# Patient Record
Sex: FEMALE | URBAN - METROPOLITAN AREA
[De-identification: names, ages, dates, MRNs, and addresses within clinical notes are randomized per-mention and may not be internally consistent; named-entity substitution may affect disease eponyms.]

---

## 2024-08-13 ENCOUNTER — APPOINTMENT (OUTPATIENT)
Dept: OBSTETRICS AND GYNECOLOGY | Facility: CLINIC | Age: 29
End: 2024-08-13

## 2024-10-14 ENCOUNTER — OFFICE VISIT (OUTPATIENT)
Dept: OBSTETRICS AND GYNECOLOGY | Facility: CLINIC | Age: 29
End: 2024-10-14

## 2024-10-14 VITALS
HEART RATE: 92 BPM | DIASTOLIC BLOOD PRESSURE: 92 MMHG | BODY MASS INDEX: 28.34 KG/M2 | WEIGHT: 154 LBS | HEIGHT: 62 IN | SYSTOLIC BLOOD PRESSURE: 138 MMHG

## 2024-10-14 DIAGNOSIS — Z12.4 CERVICAL CANCER SCREENING: ICD-10-CM

## 2024-10-14 DIAGNOSIS — Z01.419 WOMEN'S ANNUAL ROUTINE GYNECOLOGICAL EXAMINATION: Primary | ICD-10-CM

## 2024-10-14 DIAGNOSIS — Z23 FLU VACCINE NEED: ICD-10-CM

## 2024-10-14 PROCEDURE — 1036F TOBACCO NON-USER: CPT | Performed by: OBSTETRICS & GYNECOLOGY

## 2024-10-14 PROCEDURE — 99385 PREV VISIT NEW AGE 18-39: CPT | Performed by: OBSTETRICS & GYNECOLOGY

## 2024-10-14 PROCEDURE — 3008F BODY MASS INDEX DOCD: CPT | Performed by: OBSTETRICS & GYNECOLOGY

## 2024-10-14 PROCEDURE — 90656 IIV3 VACC NO PRSV 0.5 ML IM: CPT | Performed by: OBSTETRICS & GYNECOLOGY

## 2024-10-14 ASSESSMENT — ENCOUNTER SYMPTOMS
MUSCULOSKELETAL NEGATIVE: 0
HEMATOLOGIC/LYMPHATIC NEGATIVE: 0
RESPIRATORY NEGATIVE: 0
PSYCHIATRIC NEGATIVE: 0
NEUROLOGICAL NEGATIVE: 0
GASTROINTESTINAL NEGATIVE: 0
ENDOCRINE NEGATIVE: 0
CONSTITUTIONAL NEGATIVE: 0
CARDIOVASCULAR NEGATIVE: 0
ALLERGIC/IMMUNOLOGIC NEGATIVE: 0
EYES NEGATIVE: 0

## 2024-10-14 ASSESSMENT — PAIN SCALES - GENERAL: PAINLEVEL: 0-NO PAIN

## 2024-10-14 ASSESSMENT — PATIENT HEALTH QUESTIONNAIRE - PHQ9
2. FEELING DOWN, DEPRESSED OR HOPELESS: NOT AT ALL
1. LITTLE INTEREST OR PLEASURE IN DOING THINGS: NOT AT ALL
SUM OF ALL RESPONSES TO PHQ9 QUESTIONS 1 AND 2: 0

## 2024-10-14 NOTE — PROGRESS NOTES
Kelly Jo 28 y.o. y/o who presents for annual gyn exam.      Preventive health  Cervical cancer screening:    HPV vaccine up-to-date  Breast cancer screening at age 42  Colon cancer screening at age 47  Flu vax today    Reproductive Life Planning  Using condoms currently, not planning pregnancy    -------------------------------------  HPI    Here to est care  Had labial cyst left side, but spontaneously drained prior to appointment    Gynecologic History:    Menses: regular, 4-5 days in length, first 2 days heaviest  No bad cramping, some lower back pain  Last Pap: no prior pap  HPV Vax: yes  Sexually active: yes, male partner  Contraception: using condoms, not planning pregnancy right now    History reviewed and updated in patient's History Tab      Social History:  Occupation: teacher, 7th grade, science  Exercise: 1 hr per day        Vitals:    10/14/24 0903   BP: (!) 138/92   Pulse: 92       Physical Exam  Constitutional:       Appearance: Normal appearance.   Genitourinary:      Vulva normal.      Right Labia: No rash, lesions, skin changes or Bartholin's cyst.     Left Labia: No lesions, skin changes, Bartholin's cyst or rash.     No cervical discharge, lesion or polyp.   Breasts:     Right: Normal.      Left: Normal.   HENT:      Head: Normocephalic and atraumatic.   Pulmonary:      Effort: Pulmonary effort is normal.   Musculoskeletal:      Cervical back: Neck supple.   Neurological:      General: No focal deficit present.      Mental Status: She is alert.   Skin:     General: Skin is warm and dry.   Psychiatric:         Behavior: Behavior normal.         Thought Content: Thought content normal.         Judgment: Judgment normal.

## 2024-10-14 NOTE — PROGRESS NOTES
Flu, vaccine per (provider)  Given IM into left deltoid  Supplied by office  Patient tolerated well  VIS given     LOT #:[id number]  Expiration date:6/30/25

## 2024-10-21 LAB
CYTOLOGY CMNT CVX/VAG CYTO-IMP: NORMAL
LAB AP HPV GENOTYPE QUESTION: YES
LAB AP HPV HR: NORMAL
LABORATORY COMMENT REPORT: NORMAL
LMP START DATE: NORMAL
PATH REPORT.TOTAL CANCER: NORMAL